# Patient Record
Sex: FEMALE | Race: WHITE | HISPANIC OR LATINO | Employment: FULL TIME | URBAN - METROPOLITAN AREA
[De-identification: names, ages, dates, MRNs, and addresses within clinical notes are randomized per-mention and may not be internally consistent; named-entity substitution may affect disease eponyms.]

---

## 2022-09-15 ENCOUNTER — HOSPITAL ENCOUNTER (EMERGENCY)
Facility: CLINIC | Age: 47
Discharge: HOME | End: 2022-09-15
Attending: EMERGENCY MEDICINE | Admitting: EMERGENCY MEDICINE
Payer: COMMERCIAL

## 2022-09-15 ENCOUNTER — APPOINTMENT (OUTPATIENT)
Dept: CT IMAGING | Facility: CLINIC | Age: 47
End: 2022-09-15
Attending: EMERGENCY MEDICINE
Payer: COMMERCIAL

## 2022-09-15 VITALS
RESPIRATION RATE: 20 BRPM | SYSTOLIC BLOOD PRESSURE: 136 MMHG | TEMPERATURE: 98.6 F | DIASTOLIC BLOOD PRESSURE: 86 MMHG | HEART RATE: 90 BPM | OXYGEN SATURATION: 98 % | WEIGHT: 222 LBS

## 2022-09-15 DIAGNOSIS — K57.32 DIVERTICULITIS OF COLON: ICD-10-CM

## 2022-09-15 LAB
ALBUMIN SERPL-MCNC: 3.8 G/DL (ref 3.5–5)
ALBUMIN UR-MCNC: NEGATIVE MG/DL
ALP SERPL-CCNC: 114 U/L (ref 45–120)
ALT SERPL W P-5'-P-CCNC: <9 U/L (ref 0–45)
ANION GAP SERPL CALCULATED.3IONS-SCNC: 10 MMOL/L (ref 5–18)
APPEARANCE UR: CLEAR
AST SERPL W P-5'-P-CCNC: 8 U/L (ref 0–40)
BACTERIA #/AREA URNS HPF: ABNORMAL /HPF
BASOPHILS # BLD AUTO: 0 10E3/UL (ref 0–0.2)
BASOPHILS NFR BLD AUTO: 0 %
BILIRUB SERPL-MCNC: 0.4 MG/DL (ref 0–1)
BILIRUB UR QL STRIP: NEGATIVE
BUN SERPL-MCNC: 9 MG/DL (ref 8–22)
C REACTIVE PROTEIN LHE: 6.2 MG/DL (ref 0–?)
CALCIUM SERPL-MCNC: 9.2 MG/DL (ref 8.5–10.5)
CHLORIDE BLD-SCNC: 105 MMOL/L (ref 98–107)
CO2 SERPL-SCNC: 24 MMOL/L (ref 22–31)
COLOR UR AUTO: ABNORMAL
CREAT SERPL-MCNC: 0.81 MG/DL (ref 0.6–1.1)
EOSINOPHIL # BLD AUTO: 0.2 10E3/UL (ref 0–0.7)
EOSINOPHIL NFR BLD AUTO: 2 %
ERYTHROCYTE [DISTWIDTH] IN BLOOD BY AUTOMATED COUNT: 14.2 % (ref 10–15)
GFR SERPL CREATININE-BSD FRML MDRD: 90 ML/MIN/1.73M2
GLUCOSE BLD-MCNC: 82 MG/DL (ref 70–125)
GLUCOSE UR STRIP-MCNC: NEGATIVE MG/DL
HCG UR QL: NEGATIVE
HCT VFR BLD AUTO: 43.4 % (ref 35–47)
HGB BLD-MCNC: 13.7 G/DL (ref 11.7–15.7)
HGB UR QL STRIP: NEGATIVE
IMM GRANULOCYTES # BLD: 0 10E3/UL
IMM GRANULOCYTES NFR BLD: 0 %
KETONES UR STRIP-MCNC: NEGATIVE MG/DL
LEUKOCYTE ESTERASE UR QL STRIP: NEGATIVE
LIPASE SERPL-CCNC: 42 U/L (ref 0–52)
LYMPHOCYTES # BLD AUTO: 2.7 10E3/UL (ref 0.8–5.3)
LYMPHOCYTES NFR BLD AUTO: 20 %
MAGNESIUM SERPL-MCNC: 2.4 MG/DL (ref 1.8–2.6)
MCH RBC QN AUTO: 26 PG (ref 26.5–33)
MCHC RBC AUTO-ENTMCNC: 31.6 G/DL (ref 31.5–36.5)
MCV RBC AUTO: 82 FL (ref 78–100)
MONOCYTES # BLD AUTO: 0.8 10E3/UL (ref 0–1.3)
MONOCYTES NFR BLD AUTO: 6 %
MUCOUS THREADS #/AREA URNS LPF: PRESENT /LPF
NEUTROPHILS # BLD AUTO: 9.4 10E3/UL (ref 1.6–8.3)
NEUTROPHILS NFR BLD AUTO: 72 %
NITRATE UR QL: NEGATIVE
NRBC # BLD AUTO: 0 10E3/UL
NRBC BLD AUTO-RTO: 0 /100
PH UR STRIP: 6.5 [PH] (ref 5–7)
PLATELET # BLD AUTO: 215 10E3/UL (ref 150–450)
POTASSIUM BLD-SCNC: 4 MMOL/L (ref 3.5–5)
PROT SERPL-MCNC: 7.8 G/DL (ref 6–8)
RBC # BLD AUTO: 5.27 10E6/UL (ref 3.8–5.2)
RBC URINE: 1 /HPF
SODIUM SERPL-SCNC: 139 MMOL/L (ref 136–145)
SP GR UR STRIP: 1.01 (ref 1–1.03)
SQUAMOUS EPITHELIAL: 2 /HPF
UROBILINOGEN UR STRIP-MCNC: <2 MG/DL
WBC # BLD AUTO: 13.1 10E3/UL (ref 4–11)
WBC URINE: 1 /HPF

## 2022-09-15 PROCEDURE — 250N000011 HC RX IP 250 OP 636: Performed by: EMERGENCY MEDICINE

## 2022-09-15 PROCEDURE — 81025 URINE PREGNANCY TEST: CPT | Performed by: EMERGENCY MEDICINE

## 2022-09-15 PROCEDURE — 86140 C-REACTIVE PROTEIN: CPT | Performed by: EMERGENCY MEDICINE

## 2022-09-15 PROCEDURE — 81001 URINALYSIS AUTO W/SCOPE: CPT | Performed by: EMERGENCY MEDICINE

## 2022-09-15 PROCEDURE — 96374 THER/PROPH/DIAG INJ IV PUSH: CPT | Mod: 59

## 2022-09-15 PROCEDURE — 83735 ASSAY OF MAGNESIUM: CPT | Performed by: EMERGENCY MEDICINE

## 2022-09-15 PROCEDURE — 250N000009 HC RX 250: Performed by: EMERGENCY MEDICINE

## 2022-09-15 PROCEDURE — 36415 COLL VENOUS BLD VENIPUNCTURE: CPT | Performed by: EMERGENCY MEDICINE

## 2022-09-15 PROCEDURE — 96361 HYDRATE IV INFUSION ADD-ON: CPT

## 2022-09-15 PROCEDURE — 74177 CT ABD & PELVIS W/CONTRAST: CPT

## 2022-09-15 PROCEDURE — 258N000003 HC RX IP 258 OP 636: Performed by: EMERGENCY MEDICINE

## 2022-09-15 PROCEDURE — 80053 COMPREHEN METABOLIC PANEL: CPT | Performed by: EMERGENCY MEDICINE

## 2022-09-15 PROCEDURE — 250N000013 HC RX MED GY IP 250 OP 250 PS 637: Performed by: EMERGENCY MEDICINE

## 2022-09-15 PROCEDURE — 71275 CT ANGIOGRAPHY CHEST: CPT

## 2022-09-15 PROCEDURE — 96375 TX/PRO/DX INJ NEW DRUG ADDON: CPT | Mod: 59

## 2022-09-15 PROCEDURE — 85025 COMPLETE CBC W/AUTO DIFF WBC: CPT | Performed by: EMERGENCY MEDICINE

## 2022-09-15 PROCEDURE — 83690 ASSAY OF LIPASE: CPT | Performed by: EMERGENCY MEDICINE

## 2022-09-15 PROCEDURE — 99285 EMERGENCY DEPT VISIT HI MDM: CPT | Mod: 25

## 2022-09-15 RX ORDER — DICYCLOMINE HCL 20 MG
20 TABLET ORAL 4 TIMES DAILY PRN
Qty: 20 TABLET | Refills: 0 | Status: SHIPPED | OUTPATIENT
Start: 2022-09-15

## 2022-09-15 RX ORDER — ONDANSETRON 4 MG/1
4 TABLET, ORALLY DISINTEGRATING ORAL EVERY 8 HOURS PRN
Qty: 20 TABLET | Refills: 0 | Status: SHIPPED | OUTPATIENT
Start: 2022-09-15

## 2022-09-15 RX ORDER — IOPAMIDOL 755 MG/ML
100 INJECTION, SOLUTION INTRAVASCULAR ONCE
Status: COMPLETED | OUTPATIENT
Start: 2022-09-15 | End: 2022-09-15

## 2022-09-15 RX ORDER — ONDANSETRON 2 MG/ML
4 INJECTION INTRAMUSCULAR; INTRAVENOUS ONCE
Status: COMPLETED | OUTPATIENT
Start: 2022-09-15 | End: 2022-09-15

## 2022-09-15 RX ADMIN — IOPAMIDOL 100 ML: 755 INJECTION, SOLUTION INTRAVENOUS at 17:41

## 2022-09-15 RX ADMIN — ALUMINUM HYDROXIDE, MAGNESIUM HYDROXIDE, AND SIMETHICONE 30 ML: 200; 200; 20 SUSPENSION ORAL at 16:53

## 2022-09-15 RX ADMIN — SODIUM CHLORIDE, POTASSIUM CHLORIDE, SODIUM LACTATE AND CALCIUM CHLORIDE 1000 ML: 600; 310; 30; 20 INJECTION, SOLUTION INTRAVENOUS at 16:53

## 2022-09-15 RX ADMIN — AMOXICILLIN AND CLAVULANATE POTASSIUM 1 TABLET: 875; 125 TABLET, FILM COATED ORAL at 19:00

## 2022-09-15 RX ADMIN — FAMOTIDINE 20 MG: 10 INJECTION, SOLUTION INTRAVENOUS at 16:55

## 2022-09-15 RX ADMIN — ONDANSETRON 4 MG: 2 INJECTION INTRAMUSCULAR; INTRAVENOUS at 17:02

## 2022-09-15 ASSESSMENT — ENCOUNTER SYMPTOMS
ABDOMINAL PAIN: 1
DIAPHORESIS: 0
FEVER: 0
NAUSEA: 1
SHORTNESS OF BREATH: 0
BLOOD IN STOOL: 0
VOMITING: 1

## 2022-09-15 ASSESSMENT — ACTIVITIES OF DAILY LIVING (ADL)
ADLS_ACUITY_SCORE: 33
ADLS_ACUITY_SCORE: 35

## 2022-09-15 NOTE — DISCHARGE INSTRUCTIONS
Take the antibiotic (Augmentin) for your colon infection.    As needed for pain control at home, take:  - over-the-counter ibuprofen 800mg by mouth every 8 hours (max dose 2400mg in 24 hours)  - over-the-counter acetaminophen 1g by mouth every 6 hours (max dose 4g in 24 hours)  - prescribed Bentyl for abdominal pain    Use the Zofran for any nausea or vomiting.    Follow up with your Primary Care provider in 2 days for a recheck.    Return to the Emergency Department for any persistent vomiting, severe worsening, or any other concerns.

## 2022-09-15 NOTE — ED TRIAGE NOTES
Daughter acting as . Began having left mid abd pain yesterday. Has progressively gotten worse. Describes pain as stabbing and feels bloated.  Pain worsens with deep breath. Denies trauma.  Has had gall bladder removed 14 years ago     Triage Assessment     Row Name 09/15/22 6365       Triage Assessment (Adult)    Airway WDL WDL       Respiratory WDL    Respiratory WDL WDL       Skin Circulation/Temperature WDL    Skin Circulation/Temperature WDL WDL       Cardiac WDL    Cardiac WDL WDL       Peripheral/Neurovascular WDL    Peripheral Neurovascular WDL WDL       Cognitive/Neuro/Behavioral WDL    Cognitive/Neuro/Behavioral WDL WDL       [place] Coma Scale    Best Eye Response 4-->(E4) spontaneous    Best Motor Response 6-->(M6) obeys commands    Best Verbal Response 5-->(V5) oriented    [place] Coma Scale Score 15

## 2022-09-15 NOTE — ED PROVIDER NOTES
"EMERGENCY DEPARTMENT ENCOUNTER      NAME: Shayla Marie  AGE: 47 year old female  YOB: 1975  MRN: [id number]  EVALUATION DATE & TIME: 9/15/2022  4:14 PM    PCP: Yessy García    ED PROVIDER: Paulo Mahajan M.D.      Chief Complaint   Patient presents with     Abdominal Pain         IMPRESSION  1. Diverticulitis of colon        PLAN  - Augmentin q12h x14 days  - NSAIDs, Bentyl, Zofran for symptoms  - close PCP follow up  - discharge to home    ED COURSE & MEDICAL DECISION MAKING    ED Course as of 09/15/22 1842   Thu Sep 15, 2022   1619 47yoF with history of cholecystectomy presenting for evaluation of LUQ pain. Reports she awoke yesterday morning with constant nonradiating \"pressure\" pain to LUQ, worse with deep breaths. No chest pain or shortness of breath. Associated nausea with 2 episode of bloodless vomiting. Had normal bloodless bowel movement. Denies history of similar. No urinary symptoms fevers, sweats, chills. Notes her abdomen was distended yesterday but not today. Drank tea without relief; took no other meds.    SBP 150s on presentation with otherwise normal vitals. Exam with mild LUQ tenderness with no peritoneal signs, no overlying skin changes, clear lungs, normal work of breathing, no CVA tenderness or overlying skin changes here either, no peripheral edema, normal neuro exam. Doubt acute aortic syndrome. Will obtain blood, urine, CT to evaluate; will include chest given pleuritic nature to pain---certainly atypical for PE though. Will give GI cocktail, IVF, Zofran, Pepcid for symptoms. Patient &  comfortable with this plan; no further questions at this time.   1805 Radiology called with CT results; no PE but does have diverticulitis or proximal descending colon. Certainly fits with clinical presentation.   1838 UA with no UTI. Labs with WBC 13, CRP 6, no anemia, no MICHELE, no glaring electrolyte abnormality, normal bilirubin/LFTs/lipase. Has simple diverticulitis. Ok for outpatient " management. Given 1st dose of Augmentin here now. Patient able to tolerate PO and walk without difficulty. Patient comfortable with discharge at this time. Return precautions and need for PCP follow up discussed and understood. No further questions at the time of discharge.       --------------------------------------------------------------------------------   --------------------------------------------------------------------------------     4:10 PM I met with the patient for the initial interview and physical examination. Discussed plan for treatment and workup in the ED.  6:42 PM I rechecked and updated the patient with laboratory and imaging results.     This patient involved a high degree of complexity in medical decision making, as significant risks were present and assessed.    Broad differential considered for this patient presenting, including but not limited to:  Diverticulitis, perforation, abscess, sepsis, SBO, acute biliary process, PE, intrathoracic etiology, pyelo, UTI    I wore the following PPE during this patient encounter:  N95 mask, face shield w/ eye protection, gloves    MEDICATIONS GIVEN IN THE EMERGENCY DEPARTMENT  Medications   amoxicillin-clavulanate (AUGMENTIN) 875-125 MG per tablet 1 tablet (has no administration in time range)   lactated ringers BOLUS 1,000 mL (1,000 mLs Intravenous New Bag 9/15/22 1653)   ondansetron (ZOFRAN) injection 4 mg (4 mg Intravenous Given 9/15/22 1702)   famotidine (PEPCID) injection 20 mg (20 mg Intravenous Given 9/15/22 1655)   lidocaine (viscous) (XYLOCAINE) 2 % 15 mL, alum & mag hydroxide-simethicone (MAALOX) 15 mL GI Cocktail (30 mLs Oral Given 9/15/22 1653)   iopamidol (ISOVUE-370) solution 100 mL (100 mLs Intravenous Given 9/15/22 3003)       NEW PRESCRIPTIONS STARTED AT TODAY'S ER VISIT  Current Discharge Medication List      START taking these medications    Details   amoxicillin-clavulanate (AUGMENTIN) 875-125 MG tablet Take 1 tablet by mouth 2  "times daily  Qty: 28 tablet, Refills: 0      dicyclomine (BENTYL) 20 MG tablet Take 1 tablet (20 mg) by mouth 4 times daily as needed (abdominal pain)  Qty: 20 tablet, Refills: 0      ondansetron (ZOFRAN ODT) 4 MG ODT tab Take 1 tablet (4 mg) by mouth every 8 hours as needed for nausea  Qty: 20 tablet, Refills: 0                 =================================================================      HPI  Patient information was obtained from: patient     Use of : N/A       Shayla Marie is a 47 year old female with a pertinent history of cholecystectomy who presents to this ED ambulation for evaluation of abdominal pain.     Patient reports abdominal pain. She states she awoke yesterday morning with constant nonradiating \"pressure\" pain to LUQ, worse with deep breaths. No chest pain or shortness of breath. Associated nausea with 2 episode of bloodless vomiting. Had normal bloodless bowel movement. Denies history of similar. No urinary symptoms fevers, sweats, chills. Notes her abdomen was distended yesterday but not today. Drank tea without relief; took no other meds.    No other medical concerns are expressed at this time.    REVIEW OF SYSTEMS   Review of Systems   Constitutional: Negative for diaphoresis and fever.   Respiratory: Negative for shortness of breath.    Cardiovascular: Negative for chest pain.   Gastrointestinal: Positive for abdominal pain, nausea and vomiting. Negative for blood in stool.   All other systems reviewed and are negative.    All other systems reviewed and are negative except as noted above in HPI.        --------------- MEDICAL HISTORY ---------------  PAST MEDICAL HISTORY:  History reviewed. No pertinent past medical history.  There is no problem list on file for this patient.      PAST SURGICAL HISTORY:  History reviewed. No pertinent surgical history.    CURRENT MEDICATIONS:      Current Facility-Administered Medications:      amoxicillin-clavulanate (AUGMENTIN) 875-125 MG " per tablet 1 tablet, 1 tablet, Oral, Once, Paulo Mahajan MD    Current Outpatient Medications:      amoxicillin-clavulanate (AUGMENTIN) 875-125 MG tablet, Take 1 tablet by mouth 2 times daily, Disp: 28 tablet, Rfl: 0     dicyclomine (BENTYL) 20 MG tablet, Take 1 tablet (20 mg) by mouth 4 times daily as needed (abdominal pain), Disp: 20 tablet, Rfl: 0     ondansetron (ZOFRAN ODT) 4 MG ODT tab, Take 1 tablet (4 mg) by mouth every 8 hours as needed for nausea, Disp: 20 tablet, Rfl: 0    ALLERGIES:  No Known Allergies    FAMILY HISTORY:  History reviewed. No pertinent family history.    SOCIAL HISTORY:   Social History     Socioeconomic History     Marital status:        --------------- PHYSICAL EXAM ---------------  Nursing notes and vitals reviewed by me.  VITALS:  Vitals:    09/15/22 1512   BP: (!) 150/98   Pulse: 86   Resp: 20   Temp: 98.6  F (37  C)   TempSrc: Oral   SpO2: 99%   Weight: 100.7 kg (222 lb)       PHYSICAL EXAM:    General:  alert, interactive, no distress  Eyes:  conjunctivae clear, conjugate gaze  HENT:  atraumatic, nose with no rhinorrhea, oropharynx clear  Neck:  no meningismus  Cardiovascular:  HR 80s during exam, regular rhythm, no murmurs, brisk cap refill  Chest:  no chest wall tenderness  Pulmonary:  no stridor, normal phonation, normal work of breathing, clear lungs bilaterally  Abdomen:  soft, nondistended, mild LUQ tenderness with no rebound or guarding  :  no CVA tenderness  Back:  no midline spinal tenderness  Musculoskeletal:  no pretibial edema, no calf tenderness. Gross ROM intact to joints of extremities with no obvious deformities.  Skin:  warm, dry, no rash  Neuro:  awake, alert, answers questions appropriately, follows commands, moves all limbs  Psych:  calm, normal affect      --------------- RESULTS ---------------  LAB:  Reviewed and interpreted by me.  Results for orders placed or performed during the hospital encounter of 09/15/22   CT Abdomen Pelvis w Contrast     Impression    IMPRESSION:   1.  Diverticulitis of the descending colon without abscess.  2.  Hepatic steatosis.  3.  Small hiatal hernia.  4.  Fibroid uterus.    5.  Report was discussed with Dr. Paulo Mahajan 9/15/2022 1806 hours.   CT Chest Pulmonary Embolism w Contrast    Impression    IMPRESSION:  1.  No evidence for pulmonary embolism.  2.  Hepatic steatosis.  3.  Small hiatal hernia.   Comprehensive metabolic panel   Result Value Ref Range    Sodium 139 136 - 145 mmol/L    Potassium 4.0 3.5 - 5.0 mmol/L    Chloride 105 98 - 107 mmol/L    Carbon Dioxide (CO2) 24 22 - 31 mmol/L    Anion Gap 10 5 - 18 mmol/L    Urea Nitrogen 9 8 - 22 mg/dL    Creatinine 0.81 0.60 - 1.10 mg/dL    Calcium 9.2 8.5 - 10.5 mg/dL    Glucose 82 70 - 125 mg/dL    Alkaline Phosphatase 114 45 - 120 U/L    AST 8 0 - 40 U/L    ALT <9 0 - 45 U/L    Protein Total 7.8 6.0 - 8.0 g/dL    Albumin 3.8 3.5 - 5.0 g/dL    Bilirubin Total 0.4 0.0 - 1.0 mg/dL    GFR Estimate 90 >60 mL/min/1.73m2   HCG qualitative urine   Result Value Ref Range    hCG Urine Qualitative Negative Negative   UA with Microscopic reflex to Culture    Specimen: Urine, Clean Catch   Result Value Ref Range    Color Urine Light Yellow Colorless, Straw, Light Yellow, Yellow    Appearance Urine Clear Clear    Glucose Urine Negative Negative mg/dL    Bilirubin Urine Negative Negative    Ketones Urine Negative Negative mg/dL    Specific Gravity Urine 1.012 1.001 - 1.030    Blood Urine Negative Negative    pH Urine 6.5 5.0 - 7.0    Protein Albumin Urine Negative Negative mg/dL    Urobilinogen Urine <2.0 <2.0 mg/dL    Nitrite Urine Negative Negative    Leukocyte Esterase Urine Negative Negative    Bacteria Urine Few (A) None Seen /HPF    Mucus Urine Present (A) None Seen /LPF    RBC Urine 1 <=2 /HPF    WBC Urine 1 <=5 /HPF    Squamous Epithelials Urine 2 (H) <=1 /HPF   Result Value Ref Range    Lipase 42 0 - 52 U/L   CBC with platelets and differential   Result Value Ref Range     WBC Count 13.1 (H) 4.0 - 11.0 10e3/uL    RBC Count 5.27 (H) 3.80 - 5.20 10e6/uL    Hemoglobin 13.7 11.7 - 15.7 g/dL    Hematocrit 43.4 35.0 - 47.0 %    MCV 82 78 - 100 fL    MCH 26.0 (L) 26.5 - 33.0 pg    MCHC 31.6 31.5 - 36.5 g/dL    RDW 14.2 10.0 - 15.0 %    Platelet Count 215 150 - 450 10e3/uL    % Neutrophils 72 %    % Lymphocytes 20 %    % Monocytes 6 %    % Eosinophils 2 %    % Basophils 0 %    % Immature Granulocytes 0 %    NRBCs per 100 WBC 0 <1 /100    Absolute Neutrophils 9.4 (H) 1.6 - 8.3 10e3/uL    Absolute Lymphocytes 2.7 0.8 - 5.3 10e3/uL    Absolute Monocytes 0.8 0.0 - 1.3 10e3/uL    Absolute Eosinophils 0.2 0.0 - 0.7 10e3/uL    Absolute Basophils 0.0 0.0 - 0.2 10e3/uL    Absolute Immature Granulocytes 0.0 <=0.4 10e3/uL    Absolute NRBCs 0.0 10e3/uL   CRP inflammation   Result Value Ref Range    CRP 6.2 (H) 0.0 - <0.8 mg/dL   Result Value Ref Range    Magnesium 2.4 1.8 - 2.6 mg/dL       RADIOLOGY:  Reviewed by me. Please see official radiology report.  Recent Results (from the past 24 hour(s))   CT Chest Pulmonary Embolism w Contrast    Narrative    EXAM: CT CHEST PULMONARY EMBOLISM W CONTRAST  LOCATION: [facility]  DATE/TIME: 9/15/2022 5:44 PM    INDICATION: pleuritic LUQ pain  COMPARISON: None.  TECHNIQUE: CT chest pulmonary angiogram during arterial phase injection of IV contrast. Multiplanar reformats and MIP reconstructions were performed. Dose reduction techniques were used.   CONTRAST: ISOVUE 370 100mL    FINDINGS:  ANGIOGRAM CHEST: Pulmonary arteries are normal caliber and negative for pulmonary emboli. Thoracic aorta is negative for dissection. No CT evidence of right heart strain.    LUNGS AND PLEURA: Minimal linear atelectasis or scarring in the right upper lobe anteriorly. No pleural effusions.    MEDIASTINUM/AXILLAE: No adenopathy. Mild cardiomegaly. No pericardial effusion. Small hiatal hernia.    CORONARY ARTERY CALCIFICATION: None.    UPPER ABDOMEN: Hepatic  steatosis. Previous cholecystectomy.    MUSCULOSKELETAL: Degenerative change in the spine.      Impression    IMPRESSION:  1.  No evidence for pulmonary embolism.  2.  Hepatic steatosis.  3.  Small hiatal hernia.   CT Abdomen Pelvis w Contrast    Narrative    EXAM: CT ABDOMEN PELVIS W CONTRAST  LOCATION: [facility]  DATE/TIME: 9/15/2022 5:44 PM    INDICATION: LUQ pain  COMPARISON: None.  TECHNIQUE: CT scan of the abdomen and pelvis was performed following injection of IV contrast. Multiplanar reformats were obtained. Dose reduction techniques were used.  CONTRAST: ISOVUE 370 100mL    FINDINGS:   LOWER CHEST: Mild cardiomegaly. Small hiatal hernia.    HEPATOBILIARY: Hepatic steatosis. Previous cholecystectomy.    PANCREAS: Normal.    SPLEEN: Normal.    ADRENAL GLANDS: Normal.    KIDNEYS/BLADDER: Normal.    BOWEL: Normal small bowel and appendix. There are multiple colonic diverticula. Mild wall thickening and soft tissue stranding of the proximal descending colon is seen surrounding several diverticula with soft tissue stranding extending into the left   paracolic gutter. Findings are compatible with acute diverticulitis. There is no evidence for perforation or abscess.    LYMPH NODES: Small subcentimeter retroperitoneal lymph nodes. These are likely reactive.    VASCULATURE: Unremarkable.    PELVIC ORGANS: Fibroid uterus. IUD in the uterus. No adnexal masses. No free fluid in the pelvis.    MUSCULOSKELETAL: Degenerative change in the spine. Sclerotic focus in the L1 vertebral body likely a bone island.      Impression    IMPRESSION:   1.  Diverticulitis of the descending colon without abscess.  2.  Hepatic steatosis.  3.  Small hiatal hernia.  4.  Fibroid uterus.    5.  Report was discussed with Dr. Paulo Mahajan 9/15/2022 1806 hours.           Mary WELLS, am serving as a scribe to document services personally performed by Dr. Paulo Mahajan based on my observation and the  provider's statements to me. I, Paulo Mahajan MD attest that Mary Powell is acting in a scribe capacity, has observed my performance of the services and has documented them in accordance with my direction.      Paulo Mahajan MD  09/15/22  Emergency Medicine  [facility] EMERGENCY ROOM  [address] [address]  [phone]  Dept: [phone]       Paulo Mahajan MD  09/16/22 0009

## 2023-06-01 ENCOUNTER — HOSPITAL ENCOUNTER (EMERGENCY)
Facility: CLINIC | Age: 48
Discharge: HOME | End: 2023-06-01
Attending: EMERGENCY MEDICINE | Admitting: EMERGENCY MEDICINE
Payer: COMMERCIAL

## 2023-06-01 VITALS
DIASTOLIC BLOOD PRESSURE: 89 MMHG | WEIGHT: 220 LBS | SYSTOLIC BLOOD PRESSURE: 162 MMHG | OXYGEN SATURATION: 99 % | RESPIRATION RATE: 16 BRPM | TEMPERATURE: 98 F | HEART RATE: 71 BPM

## 2023-06-01 DIAGNOSIS — H66.92 LEFT OTITIS MEDIA, UNSPECIFIED OTITIS MEDIA TYPE: ICD-10-CM

## 2023-06-01 PROCEDURE — 99283 EMERGENCY DEPT VISIT LOW MDM: CPT

## 2023-06-01 RX ORDER — AMOXICILLIN 875 MG
875 TABLET ORAL 2 TIMES DAILY
Qty: 10 TABLET | Refills: 0 | Status: SHIPPED | OUTPATIENT
Start: 2023-06-01 | End: 2023-06-06

## 2023-06-01 NOTE — ED PROVIDER NOTES
EMERGENCY DEPARTMENT ENCOUNTER            IMPRESSION:  Otitis media        MEDICAL DECISION MAKING:  It was my pleasure to provide care for Shayla Marie who presented for evaluation of atraumatic left ear pain    On my exam patient is pleasant and cooperative.  No evidence of distress.  Vital signs show some hypertension.  Physical exam notable for left TM erythematous.     ED evaluation is consistent with otitis media..      Patient was reevaluated and results were discussed.  I recommended outpatient antibiotics and follow-up      Prior to making a final disposition on this patient the results of patient's tests and other diagnostic studies were discussed with the patient. All questions were answered. Patient expressed understanding of the plan and was amenable.   Return precautions and follow-up were discussed.     =================================================================  CHIEF COMPLAINT:  Chief Complaint   Patient presents with     Otalgia         HPI  Shayla Marie is a 48 year old female with no pertinent medical history who presents to the ED by private car for evaluation of ear pain.    Patient presents with right ear pain x3 days. This morning she had some bleeding from the ear. Denies poking anything in her ear or submerging in water.       REVIEW OF SYSTEMS   Constitutional: Does not report chills, unintentional weight loss or fatigue   Eyes: Does not report visual changes or discharge    HENT: Positive for right ear pain, bleeding. Does not report sore throat, neck pain  Respiratory: Does not report cough or shortness of breath    Cardiovascular: Does not report chest pain, palpitations or leg swelling  GI: Does not report abdominal pain, nausea, vomiting, or dark, bloody stools.    : Does not report hematuria, dysuria, or flank pain  Musculoskeletal: Does not report any new musculoskeletal pain or new muscle/joint pains  Skin: Does not report rash or wound  Neurologic: Does not report current  headache, new weakness, focal weakness, or sensory changes        Remainder of systems reviewed, unless noted in HPI all others negative.      PAST MEDICAL HISTORY:  No past medical history on file.    PAST SURGICAL HISTORY:  No past surgical history on file.      CURRENT MEDICATIONS:    amoxicillin (AMOXIL) 875 MG tablet  amoxicillin-clavulanate (AUGMENTIN) 875-125 MG tablet  dicyclomine (BENTYL) 20 MG tablet  ondansetron (ZOFRAN ODT) 4 MG ODT tab        ALLERGIES:  No Known Allergies    FAMILY HISTORY:  No family history on file.    SOCIAL HISTORY:   Social History     Socioeconomic History     Marital status:        PHYSICAL EXAM:    BP (!) 162/89   Pulse 71   Temp 98  F (36.7  C) (Oral)   Resp 16   Wt 99.8 kg (220 lb)   SpO2 99%     Constitutional: Awake, alert, no evidence of distress  Head: Normocephalic, atraumatic.  ENT: Left TM is erythematous.  There is no evidence of external/internal ear infection.  Mastoid nontender.  No drainage  Eyes: Pupils are reactive.  No discoloration.  Neck: No lymphadenopathy, no stridor, supple, no soft tissue swelling  Integument: Warm, dry. No rash. No bruising or petechiae.  Neurologic: Alert & oriented x 3. Normal speech. Grossly normal motor and sensory function. No focal deficits noted.   Psychiatric: Normal mood and affect.  Appropriate judgement.    ED COURSE:  6:10 PM I met the patient and performed my initial interview and exam.   6:15 PM We discussed the plan for discharge and the patient is agreeable. Reviewed supportive cares, symptomatic treatment, outpatient follow up, and reasons to return to the Emergency Department. All questions and concerns were addressed. Patient to be discharged by ED RN.           Medical Decision Making    History:    Supplemental history from: family member    External Record(s) reviewed: External medical records including care everywhere reviewed    Work Up:    EKG, laboratory and imaging studies as ordered were  independently reviewed by the provider  External consultation:    Discussion of management with another provider: N/A    Complicating factors:    Patient has a complicated past medical history including: N/A    Care affected by social determinants of health: Access to primary care    Disposition involved shared decision-making with the patient.  Patient otherwise to continue outpatient medications as prescribed.         MEDICATIONS GIVEN IN THE EMERGENCY:  Medications - No data to display        NEW PRESCRIPTIONS STARTED AT TODAY'S ER VISIT:  Discharge Medication List as of 6/1/2023  6:18 PM      START taking these medications    Details   amoxicillin (AMOXIL) 875 MG tablet Take 1 tablet (875 mg) by mouth 2 times daily for 5 days, Disp-10 tablet, R-0, Local Print                      FINAL DIAGNOSIS:    ICD-10-CM    1. Left otitis media, unspecified otitis media type  H66.92                  NAME: Shayla Marie  AGE: 48 year old female  YOB: 1975  MRN: [id number]  EVALUATION DATE & TIME: No admission date for patient encounter.    PCP: [facility]    ED PROVIDER: Kar Mondragon M.D.      Betty WELLS, am serving as a scribe to document services personally performed by Dr. Kar Mondragon based on my observation and the provider's statements to me. I, Kar Mondragon MD attest that Betty Monae is acting in a scribe capacity, has observed my performance of the services and has documented them in accordance with my direction.    Kar Mondragon M.D.  Emergency Medicine  [facility] EMERGENCY ROOM  [address] [address]  [phone]  Dept: [phone]  6/1/2023         Kar Mondragon MD  06/01/23 7477

## 2023-06-01 NOTE — ED TRIAGE NOTES
Pt reports R side otalgia for several days. This morning, pt noted some bleeding to R ear.      Triage Assessment     Row Name 06/01/23 1627       Triage Assessment (Adult)    Airway WDL WDL       Respiratory WDL    Respiratory WDL WDL       Skin Circulation/Temperature WDL    Skin Circulation/Temperature WDL WDL       Cardiac WDL    Cardiac WDL WDL       Peripheral/Neurovascular WDL    Peripheral Neurovascular WDL WDL       Cognitive/Neuro/Behavioral WDL    Cognitive/Neuro/Behavioral WDL WDL

## 2023-12-30 ENCOUNTER — HEALTH MAINTENANCE LETTER (OUTPATIENT)
Age: 48
End: 2023-12-30

## 2024-09-29 ENCOUNTER — HEALTH MAINTENANCE LETTER (OUTPATIENT)
Age: 49
End: 2024-09-29

## 2024-11-10 NOTE — Clinical Note
Shayla Marie was seen and treated in our emergency department on 9/15/2022.  She may return to work on 09/19/2022.       If you have any questions or concerns, please don't hesitate to call.      Paulo Mahajan MD
PMD

## 2025-01-18 ENCOUNTER — HEALTH MAINTENANCE LETTER (OUTPATIENT)
Age: 50
End: 2025-01-18